# Patient Record
(demographics unavailable — no encounter records)

---

## 2017-02-07 NOTE — OP
DATE OF OPERATION:  02/07/17 [facility]

 

DATE OF BIRTH:  08/19/43

 

SURGEON:  Dr. Reuben Allison.

 

ASSISTANT:  None.



ANESTHESIOLOGIST:  Salvatore Park MD

 

ANESTHESIA:  Topical with intravenous sedation.

 

PRE-OP DIAGNOSIS:  Cataract, right eye.

 

POST-OP DIAGNOSIS:  Cataract, right eye.

 

OPERATIVE PROCEDURE:  Phacoemulsification and cataract extraction with 
posterior chamber intraocular lens implant, right eye.

 

COMPLICATIONS:  None.

 

BLOOD LOSS:  None.

 

DESCRIPTION OF PROCEDURE:  The patient was brought to the operating room and 
received a small amount of intravenous sedation.  A drop of Tetracaine was 
placed in his right eye.  He was prepped and draped in the usual sterile 
fashion for ophthalmic surgery and attention was directed to the right eye 
where a speculum was placed.  A paracentesis was created at the 11 o'clock 
position and 0.1 cc of 1 percent preservative-free Lidocaine was injected into 
the anterior chamber followed by DisCoVisc. The eye was digitally stabilized 
while a 2.75 mm keratome was used to create a triplanar clear corneal incision 
at the 9 o'clock position.  A continuous curvilinear capsulorrhexis was created 
with a cystotome and Utrata forceps.  BSS on a cannula was used to hydrodissect 
the lens from the capsule.  Phacoemulsification was performed in a divide-and-
conquer technique to create four fragments which were removed.  Residual 
cortical material was removed with irrigation and aspiration. DisCoVisc was 
used to inflate the capsular bag and an SN6AD1 15.0 diopter lens was folded and 
inserted into the capsular bag.  DisCoVisc was removed using irrigation and 
aspiration.  BSS on a cannula was used to hydrate the corneal stroma and seal 
the wound.  At the end of the case the pupil was round and the lens was 
centered. The eye was of normal pressure and the wound was water tight.  The 
speculum was removed and topical Maxitrol ointment was placed on the surface of 
the eye.  The eye was closed, patched and shielded and the patient was sent to 
the recovery room in stable condition with post operative instructions and 
follow-up appointment given.

 

 [id number]/CPS #: [id number]

MTDD

## 2017-02-14 NOTE — OP
DATE OF OPERATION/DATE OF DICTATION:  02/14/2017 - [facility]

 

YOB: 1943.

 

SURGEON:  Dr. Reuben Allison.

 

ASSISTANT:  None.



ANESTHESIOLOGIST:  Wilmer Pringle DO

 

ANESTHESIA:  Topical with intravenous sedation.

 

PRE-OP DIAGNOSIS:  Cataract, left eye.

 

POST-OP DIAGNOSIS:  Cataract, left eye.

 

OPERATIVE PROCEDURE:  Phacoemulsification and cataract extraction with 
posterior chamber intraocular lens implant, left eye.

 

COMPLICATIONS:  None.

 

BLOOD LOSS:  None.

 

DESCRIPTION OF PROCEDURE:  The patient was brought to the operating room and 
received a small amount of intravenous sedation.  A drop of Tetracaine was 
placed in his left eye.  He was prepped and draped in the usual sterile fashion 
for ophthalmic surgery and attention was directed to the left eye where a 
speculum was placed.  A paracentesis was created at the 5 o'clock position and 
0.1 cc of 1 percent preservative-free Lidocaine was injected into the anterior 
chamber followed by DisCoVisc. The eye was digitally stabilized while a 2.75 mm 
keratome was used to create a triplanar clear corneal incision at the 3 o'clock 
position.  A continuous curvilinear capsulorrhexis was created with a cystotome 
and Utrata forceps.  BSS on a cannula was used to hydrodissect the lens from 
the capsule.  Phacoemulsification was performed in a divide-and-conquer 
technique to create four fragments which were removed.  Residual cortical 
material was removed with irrigation and aspiration. DisCoVisc was used to 
inflate the capsular bag and an SN6AD1 12.5 diopter lens was folded and 
inserted into the capsular bag.  DisCoVisc was removed using irrigation and 
aspiration.  BSS on a cannula was used to hydrate the corneal stroma and seal 
the wound.  At the end of the case the pupil was round and the lens was 
centered. The eye was of normal pressure and the wound was water tight.  The 
speculum was removed and topical Maxitrol ointment was placed on the surface of 
the eye. The eye was closed, patched and shielded and the patient was sent to 
the recovery room in stable condition with post operative instructions and 
follow-up appointment given.

 

 [id number]/CPS #: [id number]

[facility]